# Patient Record
Sex: MALE | Race: ASIAN | NOT HISPANIC OR LATINO
[De-identification: names, ages, dates, MRNs, and addresses within clinical notes are randomized per-mention and may not be internally consistent; named-entity substitution may affect disease eponyms.]

---

## 2024-04-09 ENCOUNTER — NON-APPOINTMENT (OUTPATIENT)
Age: 24
End: 2024-04-09

## 2024-04-10 ENCOUNTER — APPOINTMENT (OUTPATIENT)
Dept: ORTHOPEDIC SURGERY | Facility: CLINIC | Age: 24
End: 2024-04-10
Payer: COMMERCIAL

## 2024-04-10 VITALS — WEIGHT: 192 LBS | BODY MASS INDEX: 26.01 KG/M2 | HEIGHT: 72 IN

## 2024-04-10 PROBLEM — Z00.00 ENCOUNTER FOR PREVENTIVE HEALTH EXAMINATION: Status: ACTIVE | Noted: 2024-04-10

## 2024-04-10 PROCEDURE — 99203 OFFICE O/P NEW LOW 30 MIN: CPT

## 2024-04-10 PROCEDURE — 73562 X-RAY EXAM OF KNEE 3: CPT | Mod: LT

## 2024-04-12 NOTE — PHYSICAL EXAM
[de-identified] : Left knee  Constitutional:  The patient is healthy-appearing and in no apparent distress.   Gait: The patient ambulates with a normal gait and no limp.  Cardiovascular System:  The capillary refill is less than 2 seconds.   Skin:  There is a well-healed parapatellar vertical scar.     Left Knee:   Appearacne: There is lateral positioning of the patella   Bony Palpation:  There is no tenderness of the medial joint line.  There is no tenderness of the lateral joint line. There is no tenderness of the medial femoral chondyle. There is no tenderness of the lateral femoral chondyle. There is no tenderness of the tibial tubercle. There is no tenderness of the superior patella. There is no tenderness of the inferior patella. There is tenderness of the medial patellar facet. There is tenderness of the lateral patellar facet.  Soft Tissue Palpation:  There is tenderness of the medial retinaculum. There is tenderness of the lateral retinaculum. There is no tenderness of the quadriceps tendon. There is no tenderness of the patella tendon. There is no tenderness of the ITB. There is no tenderness of the pes anserine.  Active Range of Motion:  The range of motion at the knee actively and passively is 0 - 125 with pain at end flexion.   Special Tests:  There is a negative Apley. There is a negative Steinmanns.  There is a negative Lachman and Anterior Drawer. There is a negative Posterior Drawer.   There is no varus or valgus laxity.  Strength:  There is 5/5 hip flexion and 5/5 knee flexion and 4+/5 knee extension.    Psychiatric:  The patient demonstrates a normal mood and affect and is active and alert  [de-identified] : X-ray LEFT knee:  There lateral subluxation/near dislocation of the patella.  s/p femoral nailing.

## 2024-04-12 NOTE — ASSESSMENT
[FreeTextEntry1] : Discussed at length with patient severity of patellar malpositioning and by history the chronicity.  Reviewed at length with patient concern over possible femoral fixation in malrotation as well as knee internal derangement.  Reivewed need for CT scan as well as MRII to assess treatment options with consideration to patellar stabilization.

## 2024-04-12 NOTE — HISTORY OF PRESENT ILLNESS
[de-identified] : Initial visit: Left knee  Reason: Snowboarding accident  - broken femur Duration: 1 year Prior studies: x rays ordered Symptoms dull constant / sharp pain  Aggravating: extending /  walking  Pain level: 3/10 Pain medication: none Surgical Hx: 1/15/2023 Current Medication: none Allergies: NKA

## 2024-04-25 ENCOUNTER — APPOINTMENT (OUTPATIENT)
Dept: ORTHOPEDIC SURGERY | Facility: CLINIC | Age: 24
End: 2024-04-25
Payer: COMMERCIAL

## 2024-04-25 PROCEDURE — 99213 OFFICE O/P EST LOW 20 MIN: CPT

## 2024-04-25 NOTE — ASSESSMENT
[FreeTextEntry1] : Lengthy discussion was had with the patient regarding again the chronicity of the patella dislocation CT not showing any significant malrotation and the MRI and CT showing a chronically lateralized dislocated patella I reviewed at length with the patient given the chronicity of his symptoms that there is definitely a risk for long-term development of patella arthritis and he expresses understanding given the limitations and positioning he does elect to proceed with surgical realignment.  I reviewed at length with patient given no significant femoral malalignment recommendation would be for patellar realignment alone both proximal options discussed with allograft supplementation as well as possible pending intraoperative evaluation slight medialization of the tibial tubercle.  I did review with the patient that his tubercle lines up without any significant lateralization negatively but if there is any difficulty in maintaining with the soft tissue reconstruction of the patella and its normal positioning that we may slightly medialized by tibial tubercle osteotomy to help achieve a stable patella risk and benefits detailed at length and patient expresses understanding and he does elect to proceed with surgery..  Discussed with patient at length symptoms and diagnosis.  Lengthy discussion with patient regarding nonoperative and operative risks and benefits as well as surgical expectations and postop protocols and expectations, including the possibility that surgery may fail to satisfactorily resolve patient's condition / symptoms.  Patient expresses understanding and patient's questions were answered.  Patient elects to proceed with surgery.

## 2024-04-25 NOTE — PHYSICAL EXAM
[de-identified] : Left knee  Constitutional:  The patient is healthy-appearing and in no apparent distress.   Gait: The patient ambulates with a normal gait and no limp.  Cardiovascular System:  The capillary refill is less than 2 seconds.   Skin:  There is a well-healed parapatellar vertical scar.     Left Knee:   Appearacne: There is lateral positioning of the patella   Bony Palpation:  There is no tenderness of the medial joint line.  There is no tenderness of the lateral joint line. There is no tenderness of the medial femoral chondyle. There is no tenderness of the lateral femoral chondyle. There is no tenderness of the tibial tubercle. There is no tenderness of the superior patella. There is no tenderness of the inferior patella. There is tenderness of the medial patellar facet. There is tenderness of the lateral patellar facet.  Soft Tissue Palpation:  There is tenderness of the medial retinaculum. There is tenderness of the lateral retinaculum. There is no tenderness of the quadriceps tendon. There is no tenderness of the patella tendon. There is no tenderness of the ITB. There is no tenderness of the pes anserine.  Active Range of Motion:  The range of motion at the knee actively and passively is 0 - 125 with pain at end flexion.   Special Tests:  There is a negative Apley. There is a negative Steinmanns.  There is a negative Lachman and Anterior Drawer. There is a negative Posterior Drawer.   There is no varus or valgus laxity.  Strength:  There is 5/5 hip flexion and 5/5 knee flexion and 4+/5 knee extension.    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  There is no significant MPFL remnant remaining CT left femur: There is a laterally dislocated patella.  There is no significant malrotation.  There is a normal-appearing trochlear groove and no significant tibial tubercle lateralization

## 2024-04-25 NOTE — HISTORY OF PRESENT ILLNESS
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

## 2024-05-09 RX ORDER — OXYCODONE AND ACETAMINOPHEN 5; 325 MG/1; MG/1
5-325 TABLET ORAL
Qty: 40 | Refills: 0 | Status: ACTIVE | COMMUNITY
Start: 2024-05-09 | End: 1900-01-01

## 2024-05-10 ENCOUNTER — APPOINTMENT (OUTPATIENT)
Age: 24
End: 2024-05-10
Payer: COMMERCIAL

## 2024-05-10 PROCEDURE — 27422 REVISION OF UNSTABLE KNEECAP: CPT | Mod: LT,59

## 2024-05-10 PROCEDURE — 29873 ARTHRS KNEE SURG W/LAT RLS: CPT | Mod: LT,59

## 2024-05-10 PROCEDURE — 29884 ARTHRS KNEE SURG LYSIS ADS: CPT | Mod: LT,59

## 2024-05-10 PROCEDURE — 27418 REPAIR DEGENERATED KNEECAP: CPT | Mod: LT

## 2024-05-10 RX ORDER — OXYCODONE AND ACETAMINOPHEN 5; 325 MG/1; MG/1
5-325 TABLET ORAL
Qty: 40 | Refills: 0 | Status: ACTIVE | COMMUNITY
Start: 2024-05-10 | End: 1900-01-01

## 2024-05-14 ENCOUNTER — APPOINTMENT (OUTPATIENT)
Dept: ORTHOPEDIC SURGERY | Facility: CLINIC | Age: 24
End: 2024-05-14
Payer: COMMERCIAL

## 2024-05-14 PROCEDURE — 99024 POSTOP FOLLOW-UP VISIT: CPT

## 2024-05-16 NOTE — HISTORY OF PRESENT ILLNESS
[de-identified] : s/p LEFT knee MPFL reconstruction, Santhosh procedure, arthroscopic lateral release and lysis of adhesions [de-identified] : Patient is 4 days postop states overall he is doing rather well pain is well-controlled he denies any paresthesias he is taking aspirin daily he is maintaining the brace at all times [de-identified] : On evaluation left knee there is a moderate effusion dressing was removed brace is intact the patella is localized in the anatomic position with the knee in extension there is normal sensation light touch throughout wounds are clean dry and intact there is a negative Varghese.  There is 5 out of 5 ankle plantarflexion dorsiflexion [de-identified] : s/p LEFT knee MPFL reconstruction, Santhosh procedure, arthroscopic lateral release and lysis of adhesions [de-identified] : Reviewed at length again with patient severity of the instability and at this time patient to maintain the brace at all times in extension he will follow-up in 1 week at that time we will consider range of motion exercises and PT. patient was offered aspiration of the hemarthrosis and he declined at this time

## 2024-05-21 ENCOUNTER — APPOINTMENT (OUTPATIENT)
Dept: ORTHOPEDIC SURGERY | Facility: CLINIC | Age: 24
End: 2024-05-21
Payer: COMMERCIAL

## 2024-05-21 PROCEDURE — 99024 POSTOP FOLLOW-UP VISIT: CPT

## 2024-05-21 PROCEDURE — 73562 X-RAY EXAM OF KNEE 3: CPT | Mod: LT

## 2024-05-21 RX ORDER — OXYCODONE AND ACETAMINOPHEN 5; 325 MG/1; MG/1
5-325 TABLET ORAL
Qty: 40 | Refills: 0 | Status: ACTIVE | COMMUNITY
Start: 2024-05-21 | End: 1900-01-01

## 2024-05-21 NOTE — HISTORY OF PRESENT ILLNESS
[de-identified] : s/p LEFT knee MPFL reconstruction, Santhosh procedure, arthroscopic lateral release and lysis of adhesions [de-identified] : Patient is 12 days postop states overall he is doing rather well pain is well-controlled he denies any paresthesias he is taking aspirin daily he is maintaining the brace at all times [de-identified] : On evaluation left knee there is a moderate effusion, brace is intact the patella is localized in the anatomic position with the knee in extension there is normal sensation light touch throughout wounds are clean dry and intact there is a negative Varghese.  There is 5 out of 5 ankle plantarflexion dorsiflexion.  PROM o - 40 with patellar localized in groove [de-identified] : X-ray left knee:  s/p Santhosh and MPFL with hardware intact and patellar aligned [de-identified] : s/p LEFT knee MPFL reconstruction, Santhosh procedure, arthroscopic lateral release and lysis of adhesions [de-identified] : Discussed at length with patient exam overall clinical improvement and at this time he will begin physical therapy for passive range of motion and active assist range of motion and isometric quad beginning this Friday he is to maintain the brace at all times and follow-up in the office in just over 1 week for suture and staple removal

## 2024-05-30 ENCOUNTER — APPOINTMENT (OUTPATIENT)
Dept: ORTHOPEDIC SURGERY | Facility: CLINIC | Age: 24
End: 2024-05-30
Payer: COMMERCIAL

## 2024-05-30 ENCOUNTER — TRANSCRIPTION ENCOUNTER (OUTPATIENT)
Age: 24
End: 2024-05-30

## 2024-05-30 PROCEDURE — 99024 POSTOP FOLLOW-UP VISIT: CPT

## 2024-05-30 NOTE — HISTORY OF PRESENT ILLNESS
[de-identified] : s/p LEFT knee MPFL reconstruction, Santhosh procedure, arthroscopic lateral release and lysis of adhesions [de-identified] : Patient is 3 weeks postop states overall he is doing rather well pain is well-controlled he denies any paresthesias he is taking aspirin daily he is maintaining the brace at all times.  Patient has not started PT yet [de-identified] : On evaluation left knee there is a moderate effusion, brace is intact the patella is localized in the anatomic position with the knee in extension there is normal sensation light touch throughout wounds are clean dry and intact- staples removed and steri-stripped.  there is a negative Varghese.  There is 5 out of 5 ankle plantarflexion dorsiflexion.  PROM o - 60 with patellar localized in groove [de-identified] : s/p LEFT knee MPFL reconstruction, Santhosh procedure, arthroscopic lateral release and lysis of adhesions [de-identified] : Discussed at length with patient surgery and postop protocol she is to begin physical therapy motion exercises and follow-up in 3 weeks recommendation for brace weightbearing as tolerated for 1 more week

## 2024-06-27 ENCOUNTER — APPOINTMENT (OUTPATIENT)
Dept: ORTHOPEDIC SURGERY | Facility: CLINIC | Age: 24
End: 2024-06-27
Payer: COMMERCIAL

## 2024-06-27 DIAGNOSIS — S83.005S UNSPECIFIED DISLOCATION OF LEFT PATELLA, SEQUELA: ICD-10-CM

## 2024-06-27 PROCEDURE — 99024 POSTOP FOLLOW-UP VISIT: CPT

## 2024-07-11 ENCOUNTER — APPOINTMENT (OUTPATIENT)
Dept: ORTHOPEDIC SURGERY | Facility: CLINIC | Age: 24
End: 2024-07-11

## 2024-07-15 ENCOUNTER — APPOINTMENT (OUTPATIENT)
Dept: ORTHOPEDIC SURGERY | Facility: CLINIC | Age: 24
End: 2024-07-15
Payer: COMMERCIAL

## 2024-07-15 PROCEDURE — 99024 POSTOP FOLLOW-UP VISIT: CPT

## 2024-07-19 ENCOUNTER — APPOINTMENT (OUTPATIENT)
Age: 24
End: 2024-07-19

## 2024-07-19 PROBLEM — M25.662 STIFFNESS OF LEFT KNEE: Status: ACTIVE | Noted: 2024-07-15

## 2024-07-19 PROCEDURE — 27570 FIXATION OF KNEE JOINT: CPT | Mod: 58,LT,59

## 2024-07-19 PROCEDURE — 29884 ARTHRS KNEE SURG LYSIS ADS: CPT | Mod: 58,LT

## 2024-07-19 RX ORDER — OXYCODONE AND ACETAMINOPHEN 5; 325 MG/1; MG/1
5-325 TABLET ORAL
Qty: 40 | Refills: 0 | Status: ACTIVE | COMMUNITY
Start: 2024-07-19 | End: 1900-01-01

## 2024-07-23 ENCOUNTER — APPOINTMENT (OUTPATIENT)
Dept: ORTHOPEDIC SURGERY | Facility: CLINIC | Age: 24
End: 2024-07-23

## 2024-07-23 DIAGNOSIS — S83.005S UNSPECIFIED DISLOCATION OF LEFT PATELLA, SEQUELA: ICD-10-CM

## 2024-07-23 DIAGNOSIS — M25.662 STIFFNESS OF LEFT KNEE, NOT ELSEWHERE CLASSIFIED: ICD-10-CM

## 2024-07-23 PROCEDURE — 99024 POSTOP FOLLOW-UP VISIT: CPT

## 2024-07-23 PROCEDURE — 20610 DRAIN/INJ JOINT/BURSA W/O US: CPT | Mod: 58

## 2024-07-24 NOTE — HISTORY OF PRESENT ILLNESS
[de-identified] : s/p LEFT knee arthroscopy with arthrotomy with lysis of adhesions. JOHN [de-identified] : Patient is 4 days postop states overall is feeling improved with pain tolerated his range of motion currently 0-85 per him Did have a physical therapy session yesterday he denies any paresthesias [de-identified] : On exam of the left knee there is a moderate effusion consistent with a manipulation in the extensive synovectomy wounds are clean dry and intact there is normal sensation light touch throughout there is 5 out of 5 knee flexion extension patella is in adequate position and not dislocated he is able to actively extend the knee [de-identified] : s/p LEFT knee arthroscopy with arthrotomy with lysis of adhesions. JOHN [de-identified] : Discussed at length with patient aspiration which was done under sterile conditions by superlow injection site of 40 cc of serosanguineous fluid and increased range of motion to 0-90 he is to begin and advance physical therapy Dynasplint use and follow-up in 8 to 9 days for reevaluation

## 2024-07-24 NOTE — HISTORY OF PRESENT ILLNESS
[de-identified] : s/p LEFT knee arthroscopy with arthrotomy with lysis of adhesions. JOHN [de-identified] : Patient is 4 days postop states overall is feeling improved with pain tolerated his range of motion currently 0-85 per him Did have a physical therapy session yesterday he denies any paresthesias [de-identified] : On exam of the left knee there is a moderate effusion consistent with a manipulation in the extensive synovectomy wounds are clean dry and intact there is normal sensation light touch throughout there is 5 out of 5 knee flexion extension patella is in adequate position and not dislocated he is able to actively extend the knee [de-identified] : s/p LEFT knee arthroscopy with arthrotomy with lysis of adhesions. JOHN [de-identified] : Discussed at length with patient aspiration which was done under sterile conditions by superlow injection site of 40 cc of serosanguineous fluid and increased range of motion to 0-90 he is to begin and advance physical therapy Dynasplint use and follow-up in 8 to 9 days for reevaluation

## 2024-08-01 ENCOUNTER — APPOINTMENT (OUTPATIENT)
Dept: ORTHOPEDIC SURGERY | Facility: CLINIC | Age: 24
End: 2024-08-01

## 2024-08-01 PROCEDURE — 20610 DRAIN/INJ JOINT/BURSA W/O US: CPT | Mod: 58,LT

## 2024-08-01 PROCEDURE — 99024 POSTOP FOLLOW-UP VISIT: CPT

## 2024-08-02 RX ORDER — OXYCODONE AND ACETAMINOPHEN 5; 325 MG/1; MG/1
5-325 TABLET ORAL
Qty: 40 | Refills: 0 | Status: ACTIVE | COMMUNITY
Start: 2024-08-02 | End: 1900-01-01

## 2024-08-05 NOTE — HISTORY OF PRESENT ILLNESS
[de-identified] : s/p LEFT knee arthroscopy with arthrotomy with lysis of adhesions. JOHN [de-identified] : Patient is 14 days postop states overall is feeling improved with pain tolerated his range of motion currently 0-100   [de-identified] : On exam of the left knee there is a mild effusion consistent with a manipulation in the extensive synovectomy wounds are clean dry and intact there is normal sensation light touch throughout there is 5 out of 5 knee flexion extension patella is in adequate position and not dislocated he is able to actively extend the knee.  ROM 0 - 100 [de-identified] : s/p LEFT knee arthroscopy with arthrotomy with lysis of adhesions. JOHN [de-identified] : Discussed at length with patient aspiration which was done under sterile conditions by superolateral injection site of 15 cc of serosanguineous fluid and increased range of motion to 0-90 he is to begin and advance physical therapy Dynasplint use and follow-up in 8 to 9 days for reevaluation

## 2024-08-05 NOTE — HISTORY OF PRESENT ILLNESS
[de-identified] : s/p LEFT knee arthroscopy with arthrotomy with lysis of adhesions. JOHN [de-identified] : Patient is 14 days postop states overall is feeling improved with pain tolerated his range of motion currently 0-100   [de-identified] : On exam of the left knee there is a mild effusion consistent with a manipulation in the extensive synovectomy wounds are clean dry and intact there is normal sensation light touch throughout there is 5 out of 5 knee flexion extension patella is in adequate position and not dislocated he is able to actively extend the knee.  ROM 0 - 100 [de-identified] : s/p LEFT knee arthroscopy with arthrotomy with lysis of adhesions. JOHN [de-identified] : Discussed at length with patient aspiration which was done under sterile conditions by superolateral injection site of 15 cc of serosanguineous fluid and increased range of motion to 0-90 he is to begin and advance physical therapy Dynasplint use and follow-up in 8 to 9 days for reevaluation

## 2024-08-08 ENCOUNTER — APPOINTMENT (OUTPATIENT)
Dept: ORTHOPEDIC SURGERY | Facility: CLINIC | Age: 24
End: 2024-08-08

## 2024-08-08 PROCEDURE — 99024 POSTOP FOLLOW-UP VISIT: CPT

## 2024-08-12 NOTE — HISTORY OF PRESENT ILLNESS
[de-identified] : Last Visit: left knee follow up  Reason: LEFT knee arthroscopy  Pain level: 2/10 Symptoms: n/a Physical therapy/Home exercises: physical therapy twice a week  Patient states he is improving on a weekly basis and he feels his motion has significant proved since last evaluation and once warmed up he states he is able to get to approximately 100 degrees on flexion

## 2024-08-12 NOTE — HISTORY OF PRESENT ILLNESS
[de-identified] : Last Visit: left knee follow up  Reason: LEFT knee arthroscopy  Pain level: 2/10 Symptoms: n/a Physical therapy/Home exercises: physical therapy twice a week  Patient states he is improving on a weekly basis and he feels his motion has significant proved since last evaluation and once warmed up he states he is able to get to approximately 100 degrees on flexion

## 2024-08-12 NOTE — PHYSICAL EXAM
[de-identified] : Left knee  Constitutional:  The patient is healthy-appearing and in no apparent distress.   Gait: The patient ambulates with a normal gait and no limp.  Cardiovascular System:  The capillary refill is less than 2 seconds.   Skin:  The scar is healing with keloid formation    Left Knee:   Appearacne: There is lateral positioning of the patella   Bony Palpation:  There is no tenderness of the medial joint line.  There is no tenderness of the lateral joint line. There is no tenderness of the medial femoral chondyle. There is no tenderness of the lateral femoral chondyle. There is no tenderness of the tibial tubercle. There is no tenderness of the superior patella. There is no tenderness of the inferior patella. There is mild tenderness of the medial patellar facet. There is mild tenderness of the lateral patellar facet.  Soft Tissue Palpation:  There is tenderness of the medial retinaculum. There is tenderness of the lateral retinaculum. There is no tenderness of the quadriceps tendon. There is no tenderness of the patella tendon. There is no tenderness of the ITB. There is no tenderness of the pes anserine.  Active Range of Motion:  The range of motion at the knee actively and passively is 0 - 100 with pain at end flexion.   Special Tests:  There is a negative Apley. There is a negative Steinmanns.  There is a negative Lachman and Anterior Drawer. There is a negative Posterior Drawer.   There is no varus or valgus laxity.  Strength:  There is 5/5 hip flexion and 5/5 knee flexion and 5/5 knee extension.    Psychiatric:  The patient demonstrates a normal mood and affect and is active and alert

## 2024-08-12 NOTE — ASSESSMENT
[FreeTextEntry1] : Reviewed with patient improvement.  Patient elects to continue PT and exercises and Dynasplint.  Follow-up in 2 weeks.  If no improvement, consideration to repeat JOHN

## 2024-08-12 NOTE — PHYSICAL EXAM
[de-identified] : Left knee  Constitutional:  The patient is healthy-appearing and in no apparent distress.   Gait: The patient ambulates with a normal gait and no limp.  Cardiovascular System:  The capillary refill is less than 2 seconds.   Skin:  The scar is healing with keloid formation    Left Knee:   Appearacne: There is lateral positioning of the patella   Bony Palpation:  There is no tenderness of the medial joint line.  There is no tenderness of the lateral joint line. There is no tenderness of the medial femoral chondyle. There is no tenderness of the lateral femoral chondyle. There is no tenderness of the tibial tubercle. There is no tenderness of the superior patella. There is no tenderness of the inferior patella. There is mild tenderness of the medial patellar facet. There is mild tenderness of the lateral patellar facet.  Soft Tissue Palpation:  There is tenderness of the medial retinaculum. There is tenderness of the lateral retinaculum. There is no tenderness of the quadriceps tendon. There is no tenderness of the patella tendon. There is no tenderness of the ITB. There is no tenderness of the pes anserine.  Active Range of Motion:  The range of motion at the knee actively and passively is 0 - 100 with pain at end flexion.   Special Tests:  There is a negative Apley. There is a negative Steinmanns.  There is a negative Lachman and Anterior Drawer. There is a negative Posterior Drawer.   There is no varus or valgus laxity.  Strength:  There is 5/5 hip flexion and 5/5 knee flexion and 5/5 knee extension.    Psychiatric:  The patient demonstrates a normal mood and affect and is active and alert

## 2024-08-12 NOTE — ASSESSMENT
Problem: Falls - Risk of  Goal: Absence of falls  Outcome: Ongoing [FreeTextEntry1] : Reviewed with patient improvement.  Patient elects to continue PT and exercises and Dynasplint.  Follow-up in 2 weeks.  If no improvement, consideration to repeat JOHN

## 2024-08-22 ENCOUNTER — APPOINTMENT (OUTPATIENT)
Dept: ORTHOPEDIC SURGERY | Facility: CLINIC | Age: 24
End: 2024-08-22
Payer: COMMERCIAL

## 2024-08-22 DIAGNOSIS — M25.662 STIFFNESS OF LEFT KNEE, NOT ELSEWHERE CLASSIFIED: ICD-10-CM

## 2024-08-22 PROCEDURE — 99024 POSTOP FOLLOW-UP VISIT: CPT

## 2024-08-22 NOTE — PHYSICAL EXAM
[de-identified] : Left knee  Constitutional:  The patient is healthy-appearing and in no apparent distress.   Gait: The patient ambulates with a normal gait and no limp.  Cardiovascular System:  The capillary refill is less than 2 seconds.   Skin:  The scar is healing with keloid formation    Left Knee:   Appearacne: There is lateral positioning of the patella   Bony Palpation:  There is no tenderness of the medial joint line.  There is no tenderness of the lateral joint line. There is no tenderness of the medial femoral chondyle. There is no tenderness of the lateral femoral chondyle. There is no tenderness of the tibial tubercle. There is no tenderness of the superior patella. There is no tenderness of the inferior patella. There is mild tenderness of the medial patellar facet. There is mild tenderness of the lateral patellar facet.  Soft Tissue Palpation:  There is tenderness of the medial retinaculum. There is tenderness of the lateral retinaculum. There is no tenderness of the quadriceps tendon. There is no tenderness of the patella tendon. There is no tenderness of the ITB. There is no tenderness of the pes anserine.  Active Range of Motion:  The range of motion at the knee actively and passively is 0 - 100 with pain at end flexion.   Special Tests:  There is a negative Apley. There is a negative Steinmanns.  There is a negative Lachman and Anterior Drawer. There is a negative Posterior Drawer.   There is no varus or valgus laxity.  Strength:  There is 5/5 hip flexion and 5/5 knee flexion and 5/5 knee extension.    Psychiatric:  The patient demonstrates a normal mood and affect and is active and alert

## 2024-08-22 NOTE — ASSESSMENT
[FreeTextEntry1] : Discussed at length with patient overall clinical improvement after the most recent manipulation under anesthesia and lysis of adhesions however persistent endrange stiffness at approximately 95 to 100 degrees of flexion given the previous stiffness with considerations to continued home exercises therapy and splinting however given no significant change the last 2 weeks with his pain localized primarily to the knee consideration to manipulation under anesthesia with the hope that we are able to avoid another lysis of adhesions and he is agreeable he would like to proceed as soon as possible with a manipulation under anesthesia and possible lysis of adhesions depending on how manipulation goes

## 2024-08-27 ENCOUNTER — APPOINTMENT (OUTPATIENT)
Dept: ORTHOPEDIC SURGERY | Facility: CLINIC | Age: 24
End: 2024-08-27

## 2024-08-29 RX ORDER — OXYCODONE AND ACETAMINOPHEN 5; 325 MG/1; MG/1
5-325 TABLET ORAL
Qty: 40 | Refills: 0 | Status: ACTIVE | COMMUNITY
Start: 2024-08-29 | End: 1900-01-01

## 2024-08-30 ENCOUNTER — APPOINTMENT (OUTPATIENT)
Age: 24
End: 2024-08-30

## 2024-08-30 PROCEDURE — 27570 FIXATION OF KNEE JOINT: CPT | Mod: LT,59

## 2024-08-30 PROCEDURE — 29884 ARTHRS KNEE SURG LYSIS ADS: CPT | Mod: LT

## 2024-09-04 ENCOUNTER — APPOINTMENT (OUTPATIENT)
Dept: ORTHOPEDIC SURGERY | Facility: CLINIC | Age: 24
End: 2024-09-04
Payer: COMMERCIAL

## 2024-09-04 PROCEDURE — 99024 POSTOP FOLLOW-UP VISIT: CPT

## 2024-09-04 RX ORDER — CEPHALEXIN 500 MG/1
500 TABLET ORAL
Qty: 28 | Refills: 0 | Status: ACTIVE | COMMUNITY
Start: 2024-09-04 | End: 1900-01-01

## 2024-09-06 NOTE — HISTORY OF PRESENT ILLNESS
[de-identified] : s/p LEFT knee arthroscopy with arthrotomy with lysis of adhesions. JOHN [de-identified] : On exam of the left knee, there is a moderate effusion.  There is serosanginous drainage from the lateral portal.  There is no erythema.  ROM is 0 - 100.   The medial portal is intact with sutures. [de-identified] : s/p LEFT knee arthroscopy with arthrotomy with lysis of adhesions. JOHN [de-identified] : Discussed at length with patient concern for portal serosanginous drainage and focal wound dehissence and in particular it occuring 2 days ago and not being informed.  Reviewed treatment options in the moment and the area was sterilized with alcohol and a sterile needle / suture (3-0 nylon) was placed x3 to close the dehissence (was 5 x 5 mm in size).  Under sterile conditions as well via a superolateral site, 25 cc of serosanginuous fluid was apirated from the knee joint.   GIven the drainage, detailed discussion with patient over risk for infection and patient placed on Keflex 500 every 6 hours.  I discussed need for close observation and if any persistent drainage, possible need for wound irrigation and closure in the OR.  A sterile Kerlix compressive wrap x 2 with an ACE warp was placed and patient told to remove dressing tomorrow and to contact the office around noon for an update.  He was told to call if any concerns overnight (that I would be paged).  Re-reviewed need to be informed immediately if any recurrence.  Discussed to put PT on hold and keep knee straight today to allow closure.  Reviewed his surgery and that JOHN alone was not sufficient to regain mobility for flexion and that arthroscopic lysis of adhesions was again needed and intraoperative flexion to over 130 was able to be obtained.  Patient expresses understanding and the need for ABx coverage at this time.  Reviewed the patient's propensity for scarring and capsular tightness and treatment options to resume PT, knee ROM, and dynamic splinting once woudn closure is obtained.   Again, he is to call if any concerns, call tomorrow to update and follow-up in the office on Monday.   [de-identified] : See note from same day continue home regimen   -paxil 50mg daily.   -Klonopin 1mg BID  -Zyprexa 15mg qhs  -melatonin 6mg qhs. can give trazodone 50mg po qhs prn for insomnia

## 2024-09-06 NOTE — HISTORY OF PRESENT ILLNESS
[de-identified] : s/p LEFT knee arthroscopy with arthrotomy with lysis of adhesions. JOHN [de-identified] : On exam of the left knee, there is a moderate effusion.  There is serosanginous drainage from the lateral portal.  There is no erythema.  ROM is 0 - 100.   The medial portal is intact with sutures. [de-identified] : s/p LEFT knee arthroscopy with arthrotomy with lysis of adhesions. JOHN [de-identified] : Discussed at length with patient concern for portal serosanginous drainage and focal wound dehissence and in particular it occuring 2 days ago and not being informed.  Reviewed treatment options in the moment and the area was sterilized with alcohol and a sterile needle / suture (3-0 nylon) was placed x3 to close the dehissence (was 5 x 5 mm in size).  Under sterile conditions as well via a superolateral site, 25 cc of serosanginuous fluid was apirated from the knee joint.   GIven the drainage, detailed discussion with patient over risk for infection and patient placed on Keflex 500 every 6 hours.  I discussed need for close observation and if any persistent drainage, possible need for wound irrigation and closure in the OR.  A sterile Kerlix compressive wrap x 2 with an ACE warp was placed and patient told to remove dressing tomorrow and to contact the office around noon for an update.  He was told to call if any concerns overnight (that I would be paged).  Re-reviewed need to be informed immediately if any recurrence.  Discussed to put PT on hold and keep knee straight today to allow closure.  Reviewed his surgery and that JOHN alone was not sufficient to regain mobility for flexion and that arthroscopic lysis of adhesions was again needed and intraoperative flexion to over 130 was able to be obtained.  Patient expresses understanding and the need for ABx coverage at this time.  Reviewed the patient's propensity for scarring and capsular tightness and treatment options to resume PT, knee ROM, and dynamic splinting once woudn closure is obtained.   Again, he is to call if any concerns, call tomorrow to update and follow-up in the office on Monday.   [de-identified] : See note from same day

## 2024-09-06 NOTE — HISTORY OF PRESENT ILLNESS
[de-identified] : s/p LEFT knee arthroscopy with arthrotomy with lysis of adhesions. JOHN [de-identified] : On exam of the left knee, there is a moderate effusion.  There is serosanginous drainage from the lateral portal.  There is no erythema.  ROM is 0 - 100.   The medial portal is intact with sutures. [de-identified] : s/p LEFT knee arthroscopy with arthrotomy with lysis of adhesions. JOHN [de-identified] : Discussed at length with patient concern for portal serosanginous drainage and focal wound dehissence and in particular it occuring 2 days ago and not being informed.  Reviewed treatment options in the moment and the area was sterilized with alcohol and a sterile needle / suture (3-0 nylon) was placed x3 to close the dehissence (was 5 x 5 mm in size).  Under sterile conditions as well via a superolateral site, 25 cc of serosanginuous fluid was apirated from the knee joint.   GIven the drainage, detailed discussion with patient over risk for infection and patient placed on Keflex 500 every 6 hours.  I discussed need for close observation and if any persistent drainage, possible need for wound irrigation and closure in the OR.  A sterile Kerlix compressive wrap x 2 with an ACE warp was placed and patient told to remove dressing tomorrow and to contact the office around noon for an update.  He was told to call if any concerns overnight (that I would be paged).  Re-reviewed need to be informed immediately if any recurrence.  Discussed to put PT on hold and keep knee straight today to allow closure.  Reviewed his surgery and that JOHN alone was not sufficient to regain mobility for flexion and that arthroscopic lysis of adhesions was again needed and intraoperative flexion to over 130 was able to be obtained.  Patient expresses understanding and the need for ABx coverage at this time.  Reviewed the patient's propensity for scarring and capsular tightness and treatment options to resume PT, knee ROM, and dynamic splinting once woudn closure is obtained.   Again, he is to call if any concerns, call tomorrow to update and follow-up in the office on Monday.   [de-identified] : See note from same day

## 2024-09-06 NOTE — HISTORY OF PRESENT ILLNESS
[de-identified] : s/p LEFT knee arthroscopy with arthrotomy with lysis of adhesions. JOHN [de-identified] : On exam of the left knee, there is a moderate effusion.  There is serosanginous drainage from the lateral portal.  There is no erythema.  ROM is 0 - 100.   The medial portal is intact with sutures. [de-identified] : s/p LEFT knee arthroscopy with arthrotomy with lysis of adhesions. JOHN [de-identified] : Discussed at length with patient concern for portal serosanginous drainage and focal wound dehissence and in particular it occuring 2 days ago and not being informed.  Reviewed treatment options in the moment and the area was sterilized with alcohol and a sterile needle / suture (3-0 nylon) was placed x3 to close the dehissence (was 5 x 5 mm in size).  Under sterile conditions as well via a superolateral site, 25 cc of serosanginuous fluid was apirated from the knee joint.   GIven the drainage, detailed discussion with patient over risk for infection and patient placed on Keflex 500 every 6 hours.  I discussed need for close observation and if any persistent drainage, possible need for wound irrigation and closure in the OR.  A sterile Kerlix compressive wrap x 2 with an ACE warp was placed and patient told to remove dressing tomorrow and to contact the office around noon for an update.  He was told to call if any concerns overnight (that I would be paged).  Re-reviewed need to be informed immediately if any recurrence.  Discussed to put PT on hold and keep knee straight today to allow closure.  Reviewed his surgery and that JOHN alone was not sufficient to regain mobility for flexion and that arthroscopic lysis of adhesions was again needed and intraoperative flexion to over 130 was able to be obtained.  Patient expresses understanding and the need for ABx coverage at this time.  Reviewed the patient's propensity for scarring and capsular tightness and treatment options to resume PT, knee ROM, and dynamic splinting once woudn closure is obtained.   Again, he is to call if any concerns, call tomorrow to update and follow-up in the office on Monday.   [de-identified] : See note from same day

## 2024-09-09 ENCOUNTER — APPOINTMENT (OUTPATIENT)
Dept: ORTHOPEDIC SURGERY | Facility: CLINIC | Age: 24
End: 2024-09-09
Payer: COMMERCIAL

## 2024-09-09 PROCEDURE — 99024 POSTOP FOLLOW-UP VISIT: CPT

## 2024-09-09 RX ORDER — METAXALONE 800 MG/1
800 TABLET ORAL 3 TIMES DAILY
Qty: 30 | Refills: 1 | Status: ACTIVE | COMMUNITY
Start: 2024-09-09 | End: 1900-01-01

## 2024-09-10 RX ORDER — OXYCODONE AND ACETAMINOPHEN 5; 325 MG/1; MG/1
5-325 TABLET ORAL
Qty: 40 | Refills: 0 | Status: ACTIVE | COMMUNITY
Start: 2024-09-09 | End: 1900-01-01

## 2024-09-10 NOTE — HISTORY OF PRESENT ILLNESS
[de-identified] : s/p LEFT knee arthroscopy with arthrotomy with lysis of adhesions. JOHN [de-identified] : Patient is 11 days postop.  Patient states he is taking the ABx and denies and bleeding or drainage since last appointment.  He states he resumed PT and home exercises. [de-identified] : On exam of the left knee, there is a mild effusion. The wounds are CDI with sutures. There is no erythema/warmth.  ROM is 0 - 100.    [de-identified] : s/p LEFT knee arthroscopy with arthrotomy with lysis of adhesions. JOHN [de-identified] : Discussed at length with patient current exam and treatment options.  Recommend to finish ABx course and to advance PT and home exercises at this time.  Discussed patient stating most of his "tightness" feels in his quadriceps and not the knee.  Recommend to increase Dynasplint use and most heat to his quadriceps as well as Voltaren gel and will add a muscle relaxor.   Patient to follow-up in one week but if any concerns to call the office.

## 2024-09-10 NOTE — HISTORY OF PRESENT ILLNESS
[de-identified] : s/p LEFT knee arthroscopy with arthrotomy with lysis of adhesions. JOHN [de-identified] : Patient is 11 days postop.  Patient states he is taking the ABx and denies and bleeding or drainage since last appointment.  He states he resumed PT and home exercises. [de-identified] : On exam of the left knee, there is a mild effusion. The wounds are CDI with sutures. There is no erythema/warmth.  ROM is 0 - 100.    [de-identified] : s/p LEFT knee arthroscopy with arthrotomy with lysis of adhesions. JOHN [de-identified] : Discussed at length with patient current exam and treatment options.  Recommend to finish ABx course and to advance PT and home exercises at this time.  Discussed patient stating most of his "tightness" feels in his quadriceps and not the knee.  Recommend to increase Dynasplint use and most heat to his quadriceps as well as Voltaren gel and will add a muscle relaxor.   Patient to follow-up in one week but if any concerns to call the office.

## 2024-09-16 ENCOUNTER — APPOINTMENT (OUTPATIENT)
Dept: ORTHOPEDIC SURGERY | Facility: CLINIC | Age: 24
End: 2024-09-16
Payer: COMMERCIAL

## 2024-09-16 DIAGNOSIS — M25.662 STIFFNESS OF LEFT KNEE, NOT ELSEWHERE CLASSIFIED: ICD-10-CM

## 2024-09-16 DIAGNOSIS — S83.005S UNSPECIFIED DISLOCATION OF LEFT PATELLA, SEQUELA: ICD-10-CM

## 2024-09-16 PROCEDURE — 99024 POSTOP FOLLOW-UP VISIT: CPT

## 2024-09-30 ENCOUNTER — APPOINTMENT (OUTPATIENT)
Dept: ORTHOPEDIC SURGERY | Facility: CLINIC | Age: 24
End: 2024-09-30
Payer: COMMERCIAL

## 2024-09-30 PROCEDURE — 99024 POSTOP FOLLOW-UP VISIT: CPT

## 2024-09-30 RX ORDER — METAXALONE 800 MG/1
800 TABLET ORAL 3 TIMES DAILY
Qty: 30 | Refills: 1 | Status: ACTIVE | COMMUNITY
Start: 2024-09-30 | End: 1900-01-01

## 2024-10-01 NOTE — HISTORY OF PRESENT ILLNESS
[de-identified] : s/p LEFT knee arthroscopy with arthrotomy with lysis of adhesions. JOHN [de-identified] : Patient is 4.5 weeks days postop.  He states he resumed PT and home exercises. [de-identified] : On exam of the left knee, there is a mild effusion. The wounds are healed. There is no erythema/warmth.  ROM is 0 - 110.    [de-identified] : s/p LEFT knee arthroscopy with arthrotomy with lysis of adhesions. JOHN [de-identified] : Discussed at length with patient current exam and treatment options.  Advance PT and home exercises.  Follow-up in 4 weeks

## 2024-10-01 NOTE — HISTORY OF PRESENT ILLNESS
[de-identified] : s/p LEFT knee arthroscopy with arthrotomy with lysis of adhesions. JOHN [de-identified] : Patient is 4.5 weeks days postop.  He states he resumed PT and home exercises. [de-identified] : On exam of the left knee, there is a mild effusion. The wounds are healed. There is no erythema/warmth.  ROM is 0 - 110.    [de-identified] : s/p LEFT knee arthroscopy with arthrotomy with lysis of adhesions. JOHN [de-identified] : Discussed at length with patient current exam and treatment options.  Advance PT and home exercises.  Follow-up in 4 weeks

## 2024-10-07 ENCOUNTER — APPOINTMENT (OUTPATIENT)
Dept: ORTHOPEDIC SURGERY | Facility: CLINIC | Age: 24
End: 2024-10-07
Payer: COMMERCIAL

## 2024-10-07 DIAGNOSIS — M25.662 STIFFNESS OF LEFT KNEE, NOT ELSEWHERE CLASSIFIED: ICD-10-CM

## 2024-10-07 DIAGNOSIS — S83.005S UNSPECIFIED DISLOCATION OF LEFT PATELLA, SEQUELA: ICD-10-CM

## 2024-10-07 PROCEDURE — 99024 POSTOP FOLLOW-UP VISIT: CPT

## 2024-10-28 ENCOUNTER — APPOINTMENT (OUTPATIENT)
Dept: ORTHOPEDIC SURGERY | Facility: CLINIC | Age: 24
End: 2024-10-28
Payer: COMMERCIAL

## 2024-10-28 DIAGNOSIS — M25.662 STIFFNESS OF LEFT KNEE, NOT ELSEWHERE CLASSIFIED: ICD-10-CM

## 2024-10-28 DIAGNOSIS — S83.005S UNSPECIFIED DISLOCATION OF LEFT PATELLA, SEQUELA: ICD-10-CM

## 2024-10-28 PROCEDURE — 99024 POSTOP FOLLOW-UP VISIT: CPT

## 2024-10-28 RX ORDER — METAXALONE 800 MG/1
800 TABLET ORAL 3 TIMES DAILY
Qty: 30 | Refills: 1 | Status: ACTIVE | COMMUNITY
Start: 2024-10-28 | End: 1900-01-01

## 2024-11-25 ENCOUNTER — APPOINTMENT (OUTPATIENT)
Dept: ORTHOPEDIC SURGERY | Facility: CLINIC | Age: 24
End: 2024-11-25
Payer: COMMERCIAL

## 2024-11-25 DIAGNOSIS — M25.662 STIFFNESS OF LEFT KNEE, NOT ELSEWHERE CLASSIFIED: ICD-10-CM

## 2024-11-25 PROCEDURE — 99213 OFFICE O/P EST LOW 20 MIN: CPT | Mod: 24

## 2024-11-25 RX ORDER — METAXALONE 800 MG/1
800 TABLET ORAL 3 TIMES DAILY
Qty: 30 | Refills: 1 | Status: ACTIVE | COMMUNITY
Start: 2024-11-25 | End: 1900-01-01

## 2025-01-16 ENCOUNTER — APPOINTMENT (OUTPATIENT)
Dept: ORTHOPEDIC SURGERY | Facility: CLINIC | Age: 25
End: 2025-01-16

## 2025-01-16 DIAGNOSIS — M25.662 STIFFNESS OF LEFT KNEE, NOT ELSEWHERE CLASSIFIED: ICD-10-CM

## 2025-01-16 PROCEDURE — 99213 OFFICE O/P EST LOW 20 MIN: CPT

## 2025-01-16 RX ORDER — METAXALONE 800 MG/1
800 TABLET ORAL 3 TIMES DAILY
Qty: 30 | Refills: 1 | Status: ACTIVE | COMMUNITY
Start: 2025-01-16 | End: 1900-01-01

## 2025-04-17 ENCOUNTER — APPOINTMENT (OUTPATIENT)
Dept: ORTHOPEDIC SURGERY | Facility: CLINIC | Age: 25
End: 2025-04-17